# Patient Record
Sex: FEMALE | Race: WHITE
[De-identification: names, ages, dates, MRNs, and addresses within clinical notes are randomized per-mention and may not be internally consistent; named-entity substitution may affect disease eponyms.]

---

## 2017-08-16 ENCOUNTER — HOSPITAL ENCOUNTER (OUTPATIENT)
Dept: HOSPITAL 76 - LAB.F | Age: 62
Discharge: HOME | End: 2017-08-16
Attending: PHYSICIAN ASSISTANT
Payer: COMMERCIAL

## 2017-08-16 DIAGNOSIS — Z00.00: Primary | ICD-10-CM

## 2017-08-16 DIAGNOSIS — Z11.59: ICD-10-CM

## 2017-08-16 DIAGNOSIS — E55.9: ICD-10-CM

## 2017-08-16 LAB
ALBUMIN/GLOB SERPL: 1.6 {RATIO} (ref 1–2.2)
ANION GAP SERPL CALCULATED.4IONS-SCNC: 9 MMOL/L (ref 6–13)
BASOPHILS NFR BLD AUTO: 0 10^3/UL (ref 0–0.1)
BASOPHILS NFR BLD AUTO: 0.9 %
BILIRUB BLD-MCNC: 0.6 MG/DL (ref 0.2–1)
BUN SERPL-MCNC: 21 MG/DL (ref 6–20)
CALCIUM UR-MCNC: 9.4 MG/DL (ref 8.5–10.3)
CHLORIDE SERPL-SCNC: 107 MMOL/L (ref 101–111)
CHOLEST SERPL-MCNC: 219 MG/DL
CO2 SERPL-SCNC: 22 MMOL/L (ref 21–32)
CREAT SERPLBLD-SCNC: 0.7 MG/DL (ref 0.4–1)
EOSINOPHIL # BLD AUTO: 0.1 10^3/UL (ref 0–0.7)
EOSINOPHIL NFR BLD AUTO: 3 %
ERYTHROCYTE [DISTWIDTH] IN BLOOD BY AUTOMATED COUNT: 13.8 % (ref 12–15)
GFRSERPLBLD MDRD-ARVRAT: 85 ML/MIN/{1.73_M2} (ref 89–?)
GLOBULIN SER-MCNC: 2.7 G/DL (ref 2.1–4.2)
GLUCOSE SERPL-MCNC: 107 MG/DL (ref 70–100)
HCT VFR BLD AUTO: 43.3 % (ref 37–47)
HDLC SERPL-MCNC: 51 MG/DL
HDLC SERPL: 4.3 {RATIO} (ref ?–4.4)
HGB UR QL STRIP: 14.2 G/DL (ref 12–16)
LDLC/HDLC SERPL: 2.6 {RATIO} (ref ?–4.4)
LYMPHOCYTES # SPEC AUTO: 2 10^3/UL (ref 1.5–3.5)
LYMPHOCYTES NFR BLD AUTO: 40.3 %
MCH RBC QN AUTO: 30 PG (ref 27–31)
MCHC RBC AUTO-ENTMCNC: 32.9 G/DL (ref 32–36)
MCV RBC AUTO: 91.1 FL (ref 81–99)
MONOCYTES # BLD AUTO: 0.4 10^3/UL (ref 0–1)
MONOCYTES NFR BLD AUTO: 7.9 %
NEUTROPHILS # BLD AUTO: 2.3 10^3/UL (ref 1.5–6.6)
NEUTROPHILS # SNV AUTO: 4.9 X10^3/UL (ref 4.8–10.8)
NEUTROPHILS NFR BLD AUTO: 47.9 %
NRBC # BLD AUTO: 0.1 /100WBC
PDW BLD AUTO: 8.6 FL (ref 7.9–10.8)
POTASSIUM SERPL-SCNC: 4.1 MMOL/L (ref 3.5–5)
PROT SPEC-MCNC: 7.1 G/DL (ref 6.7–8.2)
RBC MAR: 4.75 10^6/UL (ref 4.2–5.4)
SODIUM SERPLBLD-SCNC: 138 MMOL/L (ref 135–145)
TRIGL P FAST SERPL-MCNC: 187 MG/DL
VLDLC SERPL-SCNC: 37 MG/DL
WBC # BLD: 4.9 X10^3/UL

## 2017-08-16 PROCEDURE — 85025 COMPLETE CBC W/AUTO DIFF WBC: CPT

## 2017-08-16 PROCEDURE — 84443 ASSAY THYROID STIM HORMONE: CPT

## 2017-08-16 PROCEDURE — 36415 COLL VENOUS BLD VENIPUNCTURE: CPT

## 2017-08-16 PROCEDURE — 82306 VITAMIN D 25 HYDROXY: CPT

## 2017-08-16 PROCEDURE — 86803 HEPATITIS C AB TEST: CPT

## 2017-08-16 PROCEDURE — 80053 COMPREHEN METABOLIC PANEL: CPT

## 2017-08-16 PROCEDURE — 80061 LIPID PANEL: CPT

## 2017-09-13 ENCOUNTER — HOSPITAL ENCOUNTER (OUTPATIENT)
Dept: HOSPITAL 76 - DI | Age: 62
Discharge: HOME | End: 2017-09-13
Attending: PHYSICIAN ASSISTANT
Payer: COMMERCIAL

## 2017-09-13 DIAGNOSIS — Z12.31: Primary | ICD-10-CM

## 2017-09-13 DIAGNOSIS — Z80.3: ICD-10-CM

## 2017-09-13 PROCEDURE — 77067 SCR MAMMO BI INCL CAD: CPT

## 2017-09-14 NOTE — MAMMOGRAPHY REPORT
DIGITAL SCREENING MAMMOGRAM: 09/13/2017

 

CLINICAL INDICATION: A 62-year-old nulliparous patient with family history of breast cancer, for scre
ening. 

 

COMPARISON: 07/2014, 07/2013, 04/2012, 02/2010. 

 

TECHNIQUE:  Routine CC and MLO projections were obtained of the breasts.  

 

FINDINGS:  Parenchymal tissue within the breasts is predominantly fatty replaced. There are no domina
nt masses, suspicious microcalcifications, or secondary signs of malignancy. In comparison to the pre
vious studies, there are no significant changes.

 

IMPRESSION:  NO MAMMOGRAPHIC EVIDENCE OF MALIGNANCY. NO SIGNIFICANT INTERVAL CHANGES. 

 

RECOMMENDATION:  Screening mammography is recommended annually. 

 

BIRADS category 1 - negative.

 

STANDARD QUALIFYING STATEMENTS

1. This examination was reviewed with the aid of Computed-Aided Detection (CAD).

2. A negative or benign imaging report should not delay biopsy if clinically suspicious findings are 
present. Consider surgical consultation if warranted. More than 5% of cancers are not identified by i
maging.

3. Dense breasts may obscure an underlying neoplasm.

 

 

 

DD:09/14/2017 10:05:49  DT: 09/14/2017 11:52  JOB #: [id number]  EXT JOB #:[id number]

## 2018-03-07 ENCOUNTER — HOSPITAL ENCOUNTER (OUTPATIENT)
Dept: HOSPITAL 76 - DI | Age: 63
Discharge: HOME | End: 2018-03-07
Attending: PHYSICIAN ASSISTANT
Payer: COMMERCIAL

## 2018-03-07 DIAGNOSIS — M79.631: Primary | ICD-10-CM

## 2018-03-07 DIAGNOSIS — Z91.81: ICD-10-CM

## 2018-03-07 NOTE — XRAY REPORT
TWO VIEW RIGHT FOREARM:  03/07/2018

 

CLINICAL INDICATION:  Fall, pain.

 

FINDINGS:  Frontal and lateral views of the right forearm demonstrate no evidence of fracture. 

No foreign body is seen in the soft tissues.

 

IMPRESSION:  NO EVIDENCE OF FRACTURE.

 

 

DD: 03/07/2018 13:32

TD: 03/07/2018 14:34

Job #: [id number]

## 2018-09-04 ENCOUNTER — HOSPITAL ENCOUNTER (OUTPATIENT)
Dept: HOSPITAL 76 - LAB.F | Age: 63
Discharge: HOME | End: 2018-09-04
Attending: PHYSICIAN ASSISTANT
Payer: COMMERCIAL

## 2018-09-04 DIAGNOSIS — Z00.00: Primary | ICD-10-CM

## 2018-09-04 DIAGNOSIS — Z79.899: ICD-10-CM

## 2018-09-04 DIAGNOSIS — E78.2: ICD-10-CM

## 2018-09-04 LAB
ALBUMIN DIAFP-MCNC: 4.3 G/DL (ref 3.2–5.5)
ALBUMIN/GLOB SERPL: 1.5 {RATIO} (ref 1–2.2)
ALP SERPL-CCNC: 92 IU/L (ref 42–121)
ALT SERPL W P-5'-P-CCNC: 22 IU/L (ref 10–60)
ANION GAP SERPL CALCULATED.4IONS-SCNC: 8 MMOL/L (ref 6–13)
AST SERPL W P-5'-P-CCNC: 17 IU/L (ref 10–42)
BASOPHILS NFR BLD AUTO: 0 10^3/UL (ref 0–0.1)
BASOPHILS NFR BLD AUTO: 0.2 %
BILIRUB BLD-MCNC: 0.9 MG/DL (ref 0.2–1)
BUN SERPL-MCNC: 23 MG/DL (ref 6–20)
CALCIUM UR-MCNC: 9.3 MG/DL (ref 8.5–10.3)
CHLORIDE SERPL-SCNC: 107 MMOL/L (ref 101–111)
CHOLEST SERPL-MCNC: 176 MG/DL
CO2 SERPL-SCNC: 23 MMOL/L (ref 21–32)
CREAT SERPLBLD-SCNC: 0.7 MG/DL (ref 0.4–1)
EOSINOPHIL # BLD AUTO: 0.1 10^3/UL (ref 0–0.7)
EOSINOPHIL NFR BLD AUTO: 2.8 %
ERYTHROCYTE [DISTWIDTH] IN BLOOD BY AUTOMATED COUNT: 13.5 % (ref 12–15)
GFRSERPLBLD MDRD-ARVRAT: 85 ML/MIN/{1.73_M2} (ref 89–?)
GLOBULIN SER-MCNC: 2.9 G/DL (ref 2.1–4.2)
GLUCOSE SERPL-MCNC: 103 MG/DL (ref 70–100)
HDLC SERPL-MCNC: 42 MG/DL
HDLC SERPL: 4.2 {RATIO} (ref ?–4.4)
HGB UR QL STRIP: 14.2 G/DL (ref 12–16)
LDLC SERPL CALC-MCNC: 105 MG/DL
LDLC/HDLC SERPL: 2.5 {RATIO} (ref ?–4.4)
LYMPHOCYTES # SPEC AUTO: 2.1 10^3/UL (ref 1.5–3.5)
LYMPHOCYTES NFR BLD AUTO: 44.9 %
MCH RBC QN AUTO: 30.3 PG (ref 27–31)
MCHC RBC AUTO-ENTMCNC: 33.7 G/DL (ref 32–36)
MCV RBC AUTO: 89.8 FL (ref 81–99)
MONOCYTES # BLD AUTO: 0.4 10^3/UL (ref 0–1)
MONOCYTES NFR BLD AUTO: 8 %
NEUTROPHILS # BLD AUTO: 2.1 10^3/UL (ref 1.5–6.6)
NEUTROPHILS # SNV AUTO: 4.7 X10^3/UL (ref 4.8–10.8)
NEUTROPHILS NFR BLD AUTO: 44.1 %
PDW BLD AUTO: 9 FL (ref 7.9–10.8)
PLATELET # BLD: 254 10^3/UL (ref 130–450)
PROT SPEC-MCNC: 7.2 G/DL (ref 6.7–8.2)
RBC MAR: 4.7 10^6/UL (ref 4.2–5.4)
SODIUM SERPLBLD-SCNC: 138 MMOL/L (ref 135–145)
VLDLC SERPL-SCNC: 29 MG/DL

## 2018-09-04 PROCEDURE — 80053 COMPREHEN METABOLIC PANEL: CPT

## 2018-09-04 PROCEDURE — 83721 ASSAY OF BLOOD LIPOPROTEIN: CPT

## 2018-09-04 PROCEDURE — 80061 LIPID PANEL: CPT

## 2018-09-04 PROCEDURE — 85025 COMPLETE CBC W/AUTO DIFF WBC: CPT

## 2018-09-04 PROCEDURE — 84443 ASSAY THYROID STIM HORMONE: CPT

## 2018-09-04 PROCEDURE — 36415 COLL VENOUS BLD VENIPUNCTURE: CPT

## 2018-10-10 ENCOUNTER — HOSPITAL ENCOUNTER (OUTPATIENT)
Dept: HOSPITAL 76 - DI | Age: 63
Discharge: HOME | End: 2018-10-10
Attending: PHYSICIAN ASSISTANT
Payer: COMMERCIAL

## 2018-10-10 DIAGNOSIS — Z80.3: ICD-10-CM

## 2018-10-10 DIAGNOSIS — M85.89: Primary | ICD-10-CM

## 2018-10-10 DIAGNOSIS — Z78.0: ICD-10-CM

## 2018-10-10 DIAGNOSIS — Z12.31: Primary | ICD-10-CM

## 2018-10-10 PROCEDURE — 77080 DXA BONE DENSITY AXIAL: CPT

## 2018-10-10 PROCEDURE — 77067 SCR MAMMO BI INCL CAD: CPT

## 2018-10-11 NOTE — MAMMOGRAPHY REPORT
Reason:  SCREENING MAMMO

Procedure Date:  10/10/2018   

Accession Number:  [id number] / [id number]                    

Procedure:  TAI - Screening Mammo Dig Bilat CPT Code:  

 

FULL RESULT:

 

 

EXAM: Screening Mammo Dig Bilat

 

DATE: 10/10/2018 9:03 AM

 

CLINICAL HISTORY: 63 year-old nulliparous female with family history of 

breast cancer in the mother at age 75.

 

TECHNIQUE: Bilateral CC and MLO views were obtained. A dedicated cleavage 

view was also obtained.

 

COMPARISON: 9/13/2017, 7/28/2014, 7/11/2013, 4/18/2012.

 

FINDINGS:

The breasts demonstrate scattered fibroglandular densities bilaterally. 

No suspicious masses, clustered microcalcifications, or regions of 

architectural distortion are identified.

 

IMPRESSION: Negative examination

 

RECOMMENDATION: Routine annual screening unless otherwise clinically 

indicated.

 

BIRADS CATEGORY 1: Negative

 

STANDARD QUALIFYING STATEMENTS:

1.  This examination was not reviewed with the aid of Computer-Aided 

Detection (CAD).

2.  A negative or benign  imaging report should not delay biopsy if 

clinically suspicious findings are present.  Consider surgical 

consultation if warrented.  More than 5% of cancers are not identified by 

imaging.

3.  Dense breasts may obscure an underlying neoplasm.

4. This examination was reviewed without the aid of 3D breast imaging 

(tomosynthesis).

## 2018-10-11 NOTE — DEXA REPORT
Reason:  POST MENOPAUSAL,SCREENING MAMMO

Procedure Date:  10/10/2018   

Accession Number:  [id number] / [id number]                    

Procedure:  DEX - Dexa Spine and/or Hip CPT Code:  

 

FULL RESULT:

 

 

EXAM: Dexa Spine and/or Hip

 

DATE: 10/10/2018 9:05 AM

 

CLINICAL HISTORY: POST MENOPAUSAL,SCREENING MAMMO

 

TECHNIQUE: Dual energy x-ray absorptiometry (DXA) was performed on a [employer] System.  Regions measured are the AP Spine, femoral neck, and if 

needed forearm.

 

COMPARISON: None.

 

In accordance with the International Society for Clinical Densitometry 

(ISCD) guidelines, data from previous exams may be reanalyzed using 

current recommendations and techniques.  This is done to allow a more 

accurate basis for comparison with the current study.

 

FINDINGS: The data for the lumbar spine is as follows:

 

                 BMD (g/cm/cm)         T-SCORE          Z-SCORE

 REGION

 L1                   1.121                   -0.1                   0.3

 L2                   1.215                   0.1                    0.5

 L3                   1.159                   -0.3                   0.0

 L4                   1.501                   2.5                    2.9

TOTAL              1.262                   0.7                  1.1

 

NOTE: All evaluable vertebrae are used for classification

 

The data for the hip is as follows:

 

                 BMD (g/cm/cm)         T-SCORE          Z-SCORE

 REGION

 Neck             0.852                       -1.3              -0.7

TOTAL            0.944                       -0.5            -0.2

 

NOTE: The femoral neck or total proximal femur, whichever is lowest, is 

used for classification.

 

IMPRESSION: THE WHO CLASSIFICATION BASED ON THE INTERNATIONAL REFERENCE 

STANDARD IS OSTEOPENIA.  THE FRACTURE RISK IS INCREASED.

 

RECOMMENDATION: Patients with diagnosis of osteoporosis or osteopenia 

should have regular bone mineral density assessment.  For those eligible 

for Medicare, routine testing is allowed once every 2 years.  Testing 

frequency can be increased for patients who have rapidly progressing 

disease or for those who are receiving medical therapy to restore bone 

mass.

 

COMMENT:  World Health Organization (WHO) definitions for osteoporosis 

and osteopenia:

NORMAL BMD:  T-score at -1.0 or higher, fracture risk is low

OSTEOPENIA BMD:  T-score between -1.0 and -2.5, fracture risk is 

increased.

OSTEOPOROSIS BMD:  T-score at -2.5 or lower, fracture risk is high.

 

National Osteoporosis Foundation recommends:

1. Obtain adequate dietary calcium (at least 1200 mg per day) and vitamin 

D (400-800 international units per day).

2. Participate, as appropriate, in regular weightbearing and 

muscle-strengthening exercise.

3. Avoid tobacco use and reduce alcohol and caffeine intake.

4. For more detailed information see the website at www.NOF.org.

## 2020-02-19 ENCOUNTER — HOSPITAL ENCOUNTER (OUTPATIENT)
Dept: HOSPITAL 76 - LAB.S | Age: 65
Discharge: HOME | End: 2020-02-19
Attending: INTERNAL MEDICINE
Payer: COMMERCIAL

## 2020-02-19 DIAGNOSIS — Z12.12: ICD-10-CM

## 2020-02-19 DIAGNOSIS — E78.5: ICD-10-CM

## 2020-02-19 DIAGNOSIS — I10: ICD-10-CM

## 2020-02-19 DIAGNOSIS — Z79.899: Primary | ICD-10-CM

## 2020-02-19 DIAGNOSIS — Z13.6: ICD-10-CM

## 2020-02-19 DIAGNOSIS — R53.83: ICD-10-CM

## 2020-02-19 DIAGNOSIS — C44.90: ICD-10-CM

## 2020-02-19 DIAGNOSIS — Z11.59: ICD-10-CM

## 2020-02-19 DIAGNOSIS — Z12.11: ICD-10-CM

## 2020-02-19 LAB
ALBUMIN DIAFP-MCNC: 4.4 G/DL (ref 3.2–5.5)
ALBUMIN/GLOB SERPL: 1.5 {RATIO} (ref 1–2.2)
ALP SERPL-CCNC: 71 IU/L (ref 42–121)
ALT SERPL W P-5'-P-CCNC: 25 IU/L (ref 10–60)
ANION GAP SERPL CALCULATED.4IONS-SCNC: 9 MMOL/L (ref 6–13)
AST SERPL W P-5'-P-CCNC: 18 IU/L (ref 10–42)
BASOPHILS NFR BLD AUTO: 0 10^3/UL (ref 0–0.1)
BASOPHILS NFR BLD AUTO: 0.4 %
BILIRUB BLD-MCNC: 0.9 MG/DL (ref 0.2–1)
BUN SERPL-MCNC: 22 MG/DL (ref 6–20)
CALCIUM UR-MCNC: 9.4 MG/DL (ref 8.5–10.3)
CHLORIDE SERPL-SCNC: 106 MMOL/L (ref 101–111)
CHOLEST SERPL-MCNC: 223 MG/DL
CK SERPL-CCNC: 56 IU/L (ref 22–269)
CO2 SERPL-SCNC: 23 MMOL/L (ref 21–32)
CREAT SERPLBLD-SCNC: 0.8 MG/DL (ref 0.4–1)
EOSINOPHIL # BLD AUTO: 0.1 10^3/UL (ref 0–0.7)
EOSINOPHIL NFR BLD AUTO: 2.3 %
ERYTHROCYTE [DISTWIDTH] IN BLOOD BY AUTOMATED COUNT: 13.2 % (ref 12–15)
GFRSERPLBLD MDRD-ARVRAT: 72 ML/MIN/{1.73_M2} (ref 89–?)
GLOBULIN SER-MCNC: 2.9 G/DL (ref 2.1–4.2)
GLUCOSE SERPL-MCNC: 103 MG/DL (ref 70–100)
HDLC SERPL-MCNC: 55 MG/DL
HDLC SERPL: 4.1 {RATIO} (ref ?–4.4)
HGB UR QL STRIP: 14.5 G/DL (ref 12–16)
LDLC SERPL CALC-MCNC: 144 MG/DL
LDLC/HDLC SERPL: 2.6 {RATIO} (ref ?–4.4)
LYMPHOCYTES # SPEC AUTO: 1.8 10^3/UL (ref 1.5–3.5)
LYMPHOCYTES NFR BLD AUTO: 38.7 %
MCH RBC QN AUTO: 30.3 PG (ref 27–31)
MCHC RBC AUTO-ENTMCNC: 32.2 G/DL (ref 32–36)
MCV RBC AUTO: 93.9 FL (ref 81–99)
MONOCYTES # BLD AUTO: 0.3 10^3/UL (ref 0–1)
MONOCYTES NFR BLD AUTO: 6.9 %
NEUTROPHILS # BLD AUTO: 2.4 10^3/UL (ref 1.5–6.6)
NEUTROPHILS # SNV AUTO: 4.8 X10^3/UL (ref 4.8–10.8)
NEUTROPHILS NFR BLD AUTO: 51.1 %
PDW BLD AUTO: 10.2 FL (ref 7.9–10.8)
PLATELET # BLD: 290 10^3/UL (ref 130–450)
PROT SPEC-MCNC: 7.3 G/DL (ref 6.7–8.2)
RBC MAR: 4.79 10^6/UL (ref 4.2–5.4)
SODIUM SERPLBLD-SCNC: 138 MMOL/L (ref 135–145)
VLDLC SERPL-SCNC: 24 MG/DL

## 2020-02-19 PROCEDURE — 86803 HEPATITIS C AB TEST: CPT

## 2020-02-19 PROCEDURE — 36415 COLL VENOUS BLD VENIPUNCTURE: CPT

## 2020-02-19 PROCEDURE — 84443 ASSAY THYROID STIM HORMONE: CPT

## 2020-02-19 PROCEDURE — 80053 COMPREHEN METABOLIC PANEL: CPT

## 2020-02-19 PROCEDURE — 82550 ASSAY OF CK (CPK): CPT

## 2020-02-19 PROCEDURE — 83721 ASSAY OF BLOOD LIPOPROTEIN: CPT

## 2020-02-19 PROCEDURE — 80061 LIPID PANEL: CPT

## 2020-02-19 PROCEDURE — 85025 COMPLETE CBC W/AUTO DIFF WBC: CPT

## 2020-02-20 LAB
HEPATITIS C ANTIBODY: (no result)
SIGNAL TO CUT-OFF: 0 (ref ?–1)

## 2020-07-07 ENCOUNTER — HOSPITAL ENCOUNTER (OUTPATIENT)
Dept: HOSPITAL 76 - DI | Age: 65
Discharge: HOME | End: 2020-07-07
Attending: INTERNAL MEDICINE
Payer: COMMERCIAL

## 2020-07-07 DIAGNOSIS — Z12.31: Primary | ICD-10-CM

## 2020-07-07 PROCEDURE — 77067 SCR MAMMO BI INCL CAD: CPT

## 2020-07-07 PROCEDURE — 77063 BREAST TOMOSYNTHESIS BI: CPT

## 2020-07-08 NOTE — MAMMOGRAPHY REPORT
BILATERAL DIGITAL SCREENING MAMMOGRAM 3D/2D: 7/7/2020

 

Comparison is made to exams dated:  10/10/2018 mammogram - [facility], 9/13/2017 ma
mmogram, 7/28/2014 mammogram, 7/11/2013 mammogram, 4/18/2012 mammogram, and 2/18/2010 mammogram.  The
 tissue of both breasts is predominantly fatty.  

 

No significant masses, calcifications, or other findings are seen in either breast.  

There has been no significant interval change.

 

IMPRESSION: NEGATIVE

There is no mammographic evidence of malignancy. A 1 year screening mammogram is recommended.  

 

 

This exam was interpreted at Station ID: [id number].  

 

NOTE: For mammograms, a report in lay terms will be sent to the patient. Approximately 15% of breast 
malignancies will not be visualized mammographically. In the management of a palpable breast mass, a 
negative mammogram must not discourage biopsy of a clinically suspicious lesion.

 

Electronically Signed By: Aretha mares/behzad:7/7/2020 10:47:12  

 

 

letter sent: Normal Exam  

ACR BI-RADS Category 1: Negative 3341F

PARENCHYMAL PATTERN: (F) - The breast(s) demonstrate(s) diffuse fatty replacement.

BI-RADS CATEGORY: (1) - 1

RECOMMENDATION: (ANNUAL)  - Recommend routine annual screening mammography.

[id number]

1 year screening

LATERALITY: (B)

## 2021-04-13 ENCOUNTER — HOSPITAL ENCOUNTER (OUTPATIENT)
Dept: HOSPITAL 76 - LAB.S | Age: 66
Discharge: HOME | End: 2021-04-13
Attending: INTERNAL MEDICINE
Payer: COMMERCIAL

## 2021-04-13 DIAGNOSIS — Z79.899: ICD-10-CM

## 2021-04-13 DIAGNOSIS — Z13.6: ICD-10-CM

## 2021-04-13 DIAGNOSIS — C44.90: ICD-10-CM

## 2021-04-13 DIAGNOSIS — E78.5: Primary | ICD-10-CM

## 2021-04-13 DIAGNOSIS — R73.01: ICD-10-CM

## 2021-04-13 DIAGNOSIS — R68.89: ICD-10-CM

## 2021-04-13 LAB
ALBUMIN DIAFP-MCNC: 4.3 G/DL (ref 3.2–5.5)
ALBUMIN/GLOB SERPL: 1.5 {RATIO} (ref 1–2.2)
ALP SERPL-CCNC: 82 IU/L (ref 42–121)
ALT SERPL W P-5'-P-CCNC: 27 IU/L (ref 10–60)
ANION GAP SERPL CALCULATED.4IONS-SCNC: 10 MMOL/L (ref 6–13)
AST SERPL W P-5'-P-CCNC: 16 IU/L (ref 10–42)
BASOPHILS NFR BLD AUTO: 0 10^3/UL (ref 0–0.1)
BASOPHILS NFR BLD AUTO: 0.4 %
BILIRUB BLD-MCNC: 0.9 MG/DL (ref 0.2–1)
BUN SERPL-MCNC: 23 MG/DL (ref 6–20)
CALCIUM UR-MCNC: 9.4 MG/DL (ref 8.5–10.3)
CHLORIDE SERPL-SCNC: 103 MMOL/L (ref 101–111)
CHOLEST SERPL-MCNC: 197 MG/DL
CK SERPL-CCNC: 66 IU/L (ref 22–269)
CO2 SERPL-SCNC: 23 MMOL/L (ref 21–32)
CREAT SERPLBLD-SCNC: 0.7 MG/DL (ref 0.4–1)
EOSINOPHIL # BLD AUTO: 0.2 10^3/UL (ref 0–0.7)
EOSINOPHIL NFR BLD AUTO: 3.1 %
ERYTHROCYTE [DISTWIDTH] IN BLOOD BY AUTOMATED COUNT: 13 % (ref 12–15)
EST. AVERAGE GLUCOSE BLD GHB EST-MCNC: 117 MG/DL (ref 70–100)
GFRSERPLBLD MDRD-ARVRAT: 84 ML/MIN/{1.73_M2} (ref 89–?)
GLOBULIN SER-MCNC: 2.8 G/DL (ref 2.1–4.2)
GLUCOSE SERPL-MCNC: 109 MG/DL (ref 70–100)
HBA1C MFR BLD HPLC: 5.7 % (ref 4.27–6.07)
HCT VFR BLD AUTO: 44.4 % (ref 37–47)
HDLC SERPL-MCNC: 47 MG/DL
HDLC SERPL: 4.2 {RATIO} (ref ?–4.4)
HGB UR QL STRIP: 14.1 G/DL (ref 12–16)
LDLC SERPL CALC-MCNC: 105 MG/DL
LDLC/HDLC SERPL: 2.2 {RATIO} (ref ?–4.4)
LYMPHOCYTES # SPEC AUTO: 2.2 10^3/UL (ref 1.5–3.5)
LYMPHOCYTES NFR BLD AUTO: 45.6 %
MCH RBC QN AUTO: 29.8 PG (ref 27–31)
MCHC RBC AUTO-ENTMCNC: 31.8 G/DL (ref 32–36)
MCV RBC AUTO: 93.9 FL (ref 81–99)
MONOCYTES # BLD AUTO: 0.4 10^3/UL (ref 0–1)
MONOCYTES NFR BLD AUTO: 7.2 %
NEUTROPHILS # BLD AUTO: 2.1 10^3/UL (ref 1.5–6.6)
NEUTROPHILS # SNV AUTO: 4.9 X10^3/UL (ref 4.8–10.8)
NEUTROPHILS NFR BLD AUTO: 43.5 %
NRBC # BLD AUTO: 0 /100WBC
NRBC # BLD AUTO: 0 X10^3/UL
PDW BLD AUTO: 10.1 FL (ref 7.9–10.8)
PLATELET # BLD: 291 10^3/UL (ref 130–450)
POTASSIUM SERPL-SCNC: 4 MMOL/L (ref 3.5–5)
PROT SPEC-MCNC: 7.1 G/DL (ref 6.7–8.2)
RBC MAR: 4.73 10^6/UL (ref 4.2–5.4)
SODIUM SERPLBLD-SCNC: 136 MMOL/L (ref 135–145)
TRIGL P FAST SERPL-MCNC: 227 MG/DL
VLDLC SERPL-SCNC: 45 MG/DL

## 2021-04-13 PROCEDURE — 84443 ASSAY THYROID STIM HORMONE: CPT

## 2021-04-13 PROCEDURE — 83721 ASSAY OF BLOOD LIPOPROTEIN: CPT

## 2021-04-13 PROCEDURE — 85025 COMPLETE CBC W/AUTO DIFF WBC: CPT

## 2021-04-13 PROCEDURE — 80053 COMPREHEN METABOLIC PANEL: CPT

## 2021-04-13 PROCEDURE — 83036 HEMOGLOBIN GLYCOSYLATED A1C: CPT

## 2021-04-13 PROCEDURE — 36415 COLL VENOUS BLD VENIPUNCTURE: CPT

## 2021-04-13 PROCEDURE — 82550 ASSAY OF CK (CPK): CPT

## 2021-04-13 PROCEDURE — 80061 LIPID PANEL: CPT

## 2021-07-19 ENCOUNTER — HOSPITAL ENCOUNTER (OUTPATIENT)
Dept: HOSPITAL 76 - DI | Age: 66
Discharge: HOME | End: 2021-07-19
Attending: INTERNAL MEDICINE
Payer: COMMERCIAL

## 2021-07-19 DIAGNOSIS — Z12.31: Primary | ICD-10-CM

## 2021-07-20 NOTE — MAMMOGRAPHY REPORT
BILATERAL DIGITAL SCREENING MAMMOGRAM 3D/2D: 7/19/2021

 

CLINICAL: Routine screening.  

 

Comparison is made to exams dated:  7/7/2020 mammogram, 10/10/2018 mammogram - [facility], 9/13/2017 mammogram, 7/28/2014 mammogram, and 7/11/2013 mammogram.  There are scattered fibro
glandular elements in both breasts.  

 

No significant masses, calcifications, or other findings are seen in either breast.  

There has been no significant interval change.

 

IMPRESSION: NEGATIVE

There is no mammographic evidence of malignancy. A 1 year screening mammogram is recommended.  

 

 

This exam was interpreted at Station ID: [id number].  

 

NOTE: For mammograms, a report in lay terms will be sent to the patient. Approximately 15% of breast 
malignancies will not be visualized mammographically. In the management of a palpable breast mass, a 
negative mammogram must not discourage biopsy of a clinically suspicious lesion.

 

Electronically Signed By: Jose A Gonsalves M.D.          

slc/penrad:7/19/2021 12:42:20  

 

 

 

ACR BI-RADS Category 1: Negative 3341F

PARENCHYMAL PATTERN: (A) - The breast(s) demonstrate(s) scattered fibroglandular densities.

BI-RADS CATEGORY: (1) - 1

RECOMMENDATION: (ANNUAL)  - Recommend routine annual screening mammography.

[id number]

1 year screening

LATERALITY: (B)

## 2022-05-04 ENCOUNTER — HOSPITAL ENCOUNTER (OUTPATIENT)
Dept: HOSPITAL 76 - LAB | Age: 67
Discharge: HOME | End: 2022-05-04
Attending: INTERNAL MEDICINE
Payer: MEDICARE

## 2022-05-04 DIAGNOSIS — Z13.6: ICD-10-CM

## 2022-05-04 DIAGNOSIS — Z00.00: Primary | ICD-10-CM

## 2022-05-04 DIAGNOSIS — C44.90: ICD-10-CM

## 2022-05-04 DIAGNOSIS — Z79.899: ICD-10-CM

## 2022-05-04 DIAGNOSIS — E78.5: ICD-10-CM

## 2022-05-04 DIAGNOSIS — R73.01: ICD-10-CM

## 2022-05-04 LAB
ALBUMIN DIAFP-MCNC: 4.3 G/DL (ref 3.2–5.5)
ALBUMIN/GLOB SERPL: 1.4 {RATIO} (ref 1–2.2)
ALP SERPL-CCNC: 79 IU/L (ref 42–121)
ALT SERPL W P-5'-P-CCNC: 26 IU/L (ref 10–60)
ANION GAP SERPL CALCULATED.4IONS-SCNC: 10 MMOL/L (ref 6–13)
AST SERPL W P-5'-P-CCNC: 19 IU/L (ref 10–42)
BILIRUB BLD-MCNC: 0.7 MG/DL (ref 0.2–1)
BUN SERPL-MCNC: 20 MG/DL (ref 6–20)
CALCIUM UR-MCNC: 9.7 MG/DL (ref 8.5–10.3)
CHLORIDE SERPL-SCNC: 107 MMOL/L (ref 101–111)
CHOLEST SERPL-MCNC: 186 MG/DL
CK SERPL-CCNC: 65 IU/L (ref 22–269)
CO2 SERPL-SCNC: 21 MMOL/L (ref 21–32)
CREAT SERPLBLD-SCNC: 0.7 MG/DL (ref 0.4–1)
EST. AVERAGE GLUCOSE BLD GHB EST-MCNC: 114 MG/DL (ref 70–100)
GFRSERPLBLD MDRD-ARVRAT: 84 ML/MIN/{1.73_M2} (ref 89–?)
GLOBULIN SER-MCNC: 3 G/DL (ref 2.1–4.2)
GLUCOSE SERPL-MCNC: 103 MG/DL (ref 70–100)
HBA1C MFR BLD HPLC: 5.6 % (ref 4.27–6.07)
HDLC SERPL-MCNC: 47 MG/DL
HDLC SERPL: 4 {RATIO} (ref ?–4.4)
LDLC SERPL CALC-MCNC: 115 MG/DL
LDLC/HDLC SERPL: 2.4 {RATIO} (ref ?–4.4)
POTASSIUM SERPL-SCNC: 4.3 MMOL/L (ref 3.5–5)
PROT SPEC-MCNC: 7.3 G/DL (ref 6.7–8.2)
SODIUM SERPLBLD-SCNC: 138 MMOL/L (ref 135–145)
TRIGL P FAST SERPL-MCNC: 122 MG/DL
VLDLC SERPL-SCNC: 24 MG/DL

## 2022-05-04 PROCEDURE — 83036 HEMOGLOBIN GLYCOSYLATED A1C: CPT

## 2022-05-04 PROCEDURE — 80061 LIPID PANEL: CPT

## 2022-05-04 PROCEDURE — 83721 ASSAY OF BLOOD LIPOPROTEIN: CPT

## 2022-05-04 PROCEDURE — 36415 COLL VENOUS BLD VENIPUNCTURE: CPT

## 2022-05-04 PROCEDURE — 80053 COMPREHEN METABOLIC PANEL: CPT

## 2022-05-04 PROCEDURE — 84443 ASSAY THYROID STIM HORMONE: CPT

## 2022-05-04 PROCEDURE — 82550 ASSAY OF CK (CPK): CPT

## 2022-05-11 ENCOUNTER — HOSPITAL ENCOUNTER (OUTPATIENT)
Dept: HOSPITAL 76 - LAB | Age: 67
Discharge: HOME | End: 2022-05-11
Attending: INTERNAL MEDICINE
Payer: MEDICARE

## 2022-05-11 DIAGNOSIS — E78.5: ICD-10-CM

## 2022-05-11 DIAGNOSIS — R73.01: ICD-10-CM

## 2022-05-11 DIAGNOSIS — Z13.6: ICD-10-CM

## 2022-05-11 DIAGNOSIS — C44.90: ICD-10-CM

## 2022-05-11 DIAGNOSIS — Z00.00: Primary | ICD-10-CM

## 2022-05-11 DIAGNOSIS — Z79.899: ICD-10-CM

## 2022-05-11 LAB
BASOPHILS NFR BLD AUTO: 0 10^3/UL (ref 0–0.1)
BASOPHILS NFR BLD AUTO: 0.5 %
EOSINOPHIL # BLD AUTO: 0.2 10^3/UL (ref 0–0.7)
EOSINOPHIL NFR BLD AUTO: 2.9 %
ERYTHROCYTE [DISTWIDTH] IN BLOOD BY AUTOMATED COUNT: 12.9 % (ref 12–15)
HCT VFR BLD AUTO: 43.3 % (ref 37–47)
HGB UR QL STRIP: 14.4 G/DL (ref 12–16)
LYMPHOCYTES # SPEC AUTO: 2 10^3/UL (ref 1.5–3.5)
LYMPHOCYTES NFR BLD AUTO: 33.4 %
MCH RBC QN AUTO: 30.4 PG (ref 27–31)
MCHC RBC AUTO-ENTMCNC: 33.3 G/DL (ref 32–36)
MCV RBC AUTO: 91.5 FL (ref 81–99)
MONOCYTES # BLD AUTO: 0.5 10^3/UL (ref 0–1)
MONOCYTES NFR BLD AUTO: 8.9 %
NEUTROPHILS # BLD AUTO: 3.2 10^3/UL (ref 1.5–6.6)
NEUTROPHILS # SNV AUTO: 6 X10^3/UL (ref 4.8–10.8)
NEUTROPHILS NFR BLD AUTO: 54.1 %
NRBC # BLD AUTO: 0 /100WBC
NRBC # BLD AUTO: 0 X10^3/UL
PDW BLD AUTO: 10.2 FL (ref 7.9–10.8)
PLATELET # BLD: 232 10^3/UL (ref 130–450)
RBC MAR: 4.73 10^6/UL (ref 4.2–5.4)

## 2022-05-11 PROCEDURE — 36415 COLL VENOUS BLD VENIPUNCTURE: CPT

## 2022-05-11 PROCEDURE — 85025 COMPLETE CBC W/AUTO DIFF WBC: CPT

## 2022-07-27 ENCOUNTER — HOSPITAL ENCOUNTER (OUTPATIENT)
Dept: HOSPITAL 76 - DI.S | Age: 67
Discharge: HOME | End: 2022-07-27
Attending: INTERNAL MEDICINE
Payer: MEDICARE

## 2022-07-27 DIAGNOSIS — Z12.31: Primary | ICD-10-CM

## 2022-07-27 DIAGNOSIS — Z80.3: ICD-10-CM

## 2022-07-27 NOTE — MAMMOGRAPHY REPORT
BILATERAL DIGITAL SCREENING MAMMOGRAM 3D/2D: 7/27/2022

 

CLINICAL: Routine screening. Family history of breast cancer.  

 

Comparison is made to exams dated:  7/19/2021 mammogram - [facility], 7/7/2020 mamm
ogram, 10/10/2018 mammogram - [facility], and 9/13/2017 mammogram.  There are scatt
ered fibroglandular elements in both breasts.  

 

No significant masses, calcifications, or other findings are seen in either breast.  

There has been no significant interval change.

 

IMPRESSION: NEGATIVE

There is no mammographic evidence of malignancy. A 1 year screening mammogram is recommended.  

 

Based on the Tyrer Cuzick model (a risk assessment model) the patients lifetime risk is 7.8% and her
 10 year risk is 3.7%. According to the ACR, ACS, and NCCN guidelines, an annual breast MRI exam christiano
g with mammogram is recommended if the patients lifetime risk is 20% or greater.

 

 

This exam was interpreted at Station ID: [id number].  

 

NOTE: For mammograms, a report in lay terms will be sent to the patient. Approximately 15% of breast 
malignancies will not be visualized mammographically. In the management of a palpable breast mass, a 
negative mammogram must not discourage biopsy of a clinically suspicious lesion.

 

Electronically Signed By: Jose A simmons/behzad:7/27/2022 11:52:23  

 

 

 

ACR BI-RADS Category 1: Negative 3341F

PARENCHYMAL PATTERN: (A) - The breast(s) demonstrate(s) scattered fibroglandular densities.

BI-RADS CATEGORY: (1) - 1

RECOMMENDATION: (ANNUAL)  - Recommend routine annual screening mammography.

[id number]

1 year screening

LATERALITY: (B)

## 2023-07-18 ENCOUNTER — HOSPITAL ENCOUNTER (OUTPATIENT)
Dept: HOSPITAL 76 - LAB | Age: 68
Discharge: HOME | End: 2023-07-18
Attending: INTERNAL MEDICINE
Payer: MEDICARE

## 2023-07-18 DIAGNOSIS — E78.5: ICD-10-CM

## 2023-07-18 DIAGNOSIS — I10: Primary | ICD-10-CM

## 2023-07-18 DIAGNOSIS — R73.01: ICD-10-CM

## 2023-07-18 DIAGNOSIS — Z79.899: ICD-10-CM

## 2023-07-18 DIAGNOSIS — C44.90: ICD-10-CM

## 2023-07-18 LAB
ALBUMIN DIAFP-MCNC: 4.8 G/DL (ref 3.2–5.5)
ALBUMIN/GLOB SERPL: 1.7 {RATIO} (ref 1–2.2)
ALP SERPL-CCNC: 86 IU/L (ref 42–121)
ALT SERPL W P-5'-P-CCNC: 25 IU/L (ref 10–60)
ANION GAP SERPL CALCULATED.4IONS-SCNC: 9 MMOL/L (ref 6–13)
AST SERPL W P-5'-P-CCNC: 21 IU/L (ref 10–42)
BASOPHILS NFR BLD AUTO: 0 10^3/UL (ref 0–0.1)
BASOPHILS NFR BLD AUTO: 0.4 %
BILIRUB BLD-MCNC: 0.9 MG/DL (ref 0.2–1)
BUN SERPL-MCNC: 18 MG/DL (ref 6–20)
CALCIUM UR-MCNC: 9.5 MG/DL (ref 8.5–10.3)
CHLORIDE SERPL-SCNC: 105 MMOL/L (ref 101–111)
CHOLEST SERPL-MCNC: 203 MG/DL
CO2 SERPL-SCNC: 26 MMOL/L (ref 21–32)
CREAT SERPLBLD-SCNC: 0.7 MG/DL (ref 0.4–1)
EOSINOPHIL # BLD AUTO: 0.1 10^3/UL (ref 0–0.7)
EOSINOPHIL NFR BLD AUTO: 2.2 %
ERYTHROCYTE [DISTWIDTH] IN BLOOD BY AUTOMATED COUNT: 13 % (ref 12–15)
EST. AVERAGE GLUCOSE BLD GHB EST-MCNC: 123 MG/DL (ref 70–100)
GFRSERPLBLD MDRD-ARVRAT: 84 ML/MIN/{1.73_M2} (ref 89–?)
GLOBULIN SER-MCNC: 2.8 G/DL (ref 2.1–4.2)
GLUCOSE SERPL-MCNC: 112 MG/DL (ref 70–100)
HBA1C MFR BLD HPLC: 5.9 % (ref 4.27–6.07)
HCT VFR BLD AUTO: 42.8 % (ref 37–47)
HDLC SERPL-MCNC: 60 MG/DL
HDLC SERPL: 3.4 {RATIO} (ref ?–4.4)
HGB UR QL STRIP: 14.3 G/DL (ref 12–16)
LDLC SERPL CALC-MCNC: 109 MG/DL
LDLC/HDLC SERPL: 1.8 {RATIO} (ref ?–4.4)
LYMPHOCYTES # SPEC AUTO: 2.2 10^3/UL (ref 1.5–3.5)
LYMPHOCYTES NFR BLD AUTO: 39.8 %
MCH RBC QN AUTO: 30.4 PG (ref 27–31)
MCHC RBC AUTO-ENTMCNC: 33.4 G/DL (ref 32–36)
MCV RBC AUTO: 90.9 FL (ref 81–99)
MONOCYTES # BLD AUTO: 0.4 10^3/UL (ref 0–1)
MONOCYTES NFR BLD AUTO: 6.8 %
NEUTROPHILS # BLD AUTO: 2.8 10^3/UL (ref 1.5–6.6)
NEUTROPHILS # SNV AUTO: 5.6 X10^3/UL (ref 4.8–10.8)
NEUTROPHILS NFR BLD AUTO: 50.6 %
NRBC # BLD AUTO: 0 /100WBC
NRBC # BLD AUTO: 0 X10^3/UL
PDW BLD AUTO: 9.1 FL (ref 7.9–10.8)
PLATELET # BLD: 258 10^3/UL (ref 130–450)
POTASSIUM SERPL-SCNC: 4.3 MMOL/L (ref 3.5–5)
PROT SPEC-MCNC: 7.6 G/DL (ref 6.7–8.2)
RBC MAR: 4.71 10^6/UL (ref 4.2–5.4)
SODIUM SERPLBLD-SCNC: 140 MMOL/L (ref 135–145)
TRIGL P FAST SERPL-MCNC: 168 MG/DL
VLDLC SERPL-SCNC: 34 MG/DL

## 2023-07-18 PROCEDURE — 80061 LIPID PANEL: CPT

## 2023-07-18 PROCEDURE — 85025 COMPLETE CBC W/AUTO DIFF WBC: CPT

## 2023-07-18 PROCEDURE — 80053 COMPREHEN METABOLIC PANEL: CPT

## 2023-07-18 PROCEDURE — 83721 ASSAY OF BLOOD LIPOPROTEIN: CPT

## 2023-07-18 PROCEDURE — 82306 VITAMIN D 25 HYDROXY: CPT

## 2023-07-18 PROCEDURE — 83036 HEMOGLOBIN GLYCOSYLATED A1C: CPT

## 2023-07-18 PROCEDURE — 84443 ASSAY THYROID STIM HORMONE: CPT

## 2023-07-18 PROCEDURE — 36415 COLL VENOUS BLD VENIPUNCTURE: CPT

## 2023-08-21 ENCOUNTER — HOSPITAL ENCOUNTER (OUTPATIENT)
Dept: HOSPITAL 76 - DI | Age: 68
Discharge: HOME | End: 2023-08-21
Attending: INTERNAL MEDICINE
Payer: MEDICARE

## 2023-08-21 DIAGNOSIS — Z12.31: Primary | ICD-10-CM

## 2023-08-22 NOTE — MAMMOGRAPHY REPORT
BILATERAL DIGITAL SCREENING MAMMOGRAM 3D/2D: 8/21/2023

 

CLINICAL: Routine screening.  

 

Comparison is made to exams dated:  7/27/2022 mammogram, 7/19/2021 mammogram - [facility], 7/7/2020 mammogram, 10/10/2018 mammogram - [facility], and 9/13/2017 mammog
andrew.  

 

There are scattered areas of fibroglandular density in both breasts (category b / 25%-50% glandular t
issue).  

 

No significant masses, calcifications, or other findings are seen in either breast.  

There has been no significant interval change.

 

IMPRESSION: NEGATIVE

There is no mammographic evidence of malignancy. A 1 year screening mammogram is recommended.  

 

Based on the Tyrer Cuzick model (a risk assessment model) the patients lifetime risk is 7.4% and her
 10 year risk is 3.7%. According to the ACR, ACS, and NCCN guidelines, an annual breast MRI exam christiano
g with mammogram is recommended if the patients lifetime risk is 20% or greater.

 

 

This exam was interpreted at Station ID: [id number].  

 

NOTE: For mammograms, a report in lay terms will be sent to the patient. Approximately 15% of breast 
malignancies will not be visualized mammographically. In the management of a palpable breast mass, a 
negative mammogram must not discourage biopsy of a clinically suspicious lesion.

 

Electronically Signed By: Derik tam/behzad:8/21/2023 15:59:27  

 

 

letter sent: No_Letter  

ACR BI-RADS Category 1: Negative 3341F

PARENCHYMAL PATTERN: (A) - The breast(s) demonstrate(s) scattered fibroglandular densities.

BI-RADS CATEGORY: (1) - 1

Mammogram

[id number]

1 year screening

LATERALITY: (B)